# Patient Record
Sex: MALE | Race: BLACK OR AFRICAN AMERICAN | NOT HISPANIC OR LATINO | Employment: UNEMPLOYED | URBAN - METROPOLITAN AREA
[De-identification: names, ages, dates, MRNs, and addresses within clinical notes are randomized per-mention and may not be internally consistent; named-entity substitution may affect disease eponyms.]

---

## 2020-11-22 ENCOUNTER — HOSPITAL ENCOUNTER (EMERGENCY)
Facility: HOSPITAL | Age: 25
Discharge: HOME | End: 2020-11-22
Attending: FAMILY MEDICINE
Payer: MEDICAID

## 2020-11-22 VITALS
RESPIRATION RATE: 20 BRPM | HEIGHT: 73 IN | TEMPERATURE: 99 F | WEIGHT: 181.56 LBS | OXYGEN SATURATION: 96 % | DIASTOLIC BLOOD PRESSURE: 64 MMHG | HEART RATE: 96 BPM | SYSTOLIC BLOOD PRESSURE: 131 MMHG | BODY MASS INDEX: 24.06 KG/M2

## 2020-11-22 DIAGNOSIS — S49.90XA ARM INJURY: ICD-10-CM

## 2020-11-22 DIAGNOSIS — S40.021A CONTUSION OF RIGHT UPPER EXTREMITY, INITIAL ENCOUNTER: Primary | ICD-10-CM

## 2020-11-22 PROCEDURE — 99283 EMERGENCY DEPT VISIT LOW MDM: CPT | Mod: 25

## 2020-11-22 RX ORDER — DICLOFENAC SODIUM 50 MG/1
50 TABLET, DELAYED RELEASE ORAL 2 TIMES DAILY
Qty: 10 TABLET | Refills: 0 | Status: SHIPPED | OUTPATIENT
Start: 2020-11-22 | End: 2020-11-27

## 2020-11-23 NOTE — ED PROVIDER NOTES
HISTORY     Chief Complaint   Patient presents with    Arm Pain     kicked by horse in r arm     Review of patient's allergies indicates:  No Known Allergies     HPI   The history is provided by the patient. No  was used.   Arm Injury   The incident occurred just prior to arrival. The incident occurred at home. Injury mechanism: roberto carlos horse kicked his right forearm. Torso injury location: right forearm. Pertinent negatives include no chest pain, no altered mental status, no numbness, no visual disturbance, no abdominal pain, no bowel incontinence, no nausea, no vomiting, no bladder incontinence, no headaches, no hearing loss, no inability to bear weight, no neck pain, no pain when bearing weight, no focal weakness, no decreased responsiveness, no light-headedness, no loss of consciousness, no seizures, no tingling, no weakness, no cough, no difficulty breathing and no memory loss.        PCP: Primary Doctor No     Past Medical History:  No past medical history on file.     Past Surgical History:  No past surgical history on file.     Family History:  No family history on file.     Social History:  Social History     Tobacco Use    Smoking status: Not on file   Substance and Sexual Activity    Alcohol use: Not on file    Drug use: Not on file    Sexual activity: Not on file         ROS   Review of Systems   Constitutional: Negative for decreased responsiveness and fever.   HENT: Negative for hearing loss and sore throat.    Eyes: Negative for visual disturbance.   Respiratory: Negative for cough and shortness of breath.    Cardiovascular: Negative for chest pain.   Gastrointestinal: Negative for abdominal pain, bowel incontinence, nausea and vomiting.   Genitourinary: Negative for bladder incontinence and dysuria.   Musculoskeletal: Negative for back pain and neck pain.        +right forearm pain     Skin: Negative for rash.   Neurological: Negative for tingling, focal weakness,  seizures, loss of consciousness, weakness, light-headedness, numbness and headaches.   Hematological: Does not bruise/bleed easily.   Psychiatric/Behavioral: Negative for memory loss.       PHYSICAL EXAM     Initial Vitals [11/22/20 2011]   BP Pulse Resp Temp SpO2   131/64 96 20 98.6 °F (37 °C) 96 %      MAP       --           Physical Exam    Nursing note and vitals reviewed.  Constitutional: He appears well-developed and well-nourished. He is not diaphoretic. No distress.   HENT:   Head: Normocephalic and atraumatic.   Eyes: Right eye exhibits no discharge. Left eye exhibits no discharge.   Neck: Normal range of motion. Neck supple.   Cardiovascular: Normal rate.   Pulmonary/Chest: No respiratory distress.   Abdominal: Soft. He exhibits no distension.   Musculoskeletal: Normal range of motion.      Comments: Mild swelling noted to mid aspect of right dorsal forearm. No obvious deformity. Radial and ulnar pulses 2+. Cap refill <2sec. Rue nvi   Neurological: He is alert and oriented to person, place, and time. He has normal strength.   Skin: Skin is warm and dry.   Psychiatric: He has a normal mood and affect. His behavior is normal. Thought content normal.          ED COURSE   Splint Application    Date/Time: 11/22/2020 8:29 PM  Performed by: Azeem Mcnair NP  Authorized by: Azeem Mcnair NP   Consent Done: Yes  Consent: Verbal consent obtained. Written consent not obtained.  Risks and benefits: risks, benefits and alternatives were discussed  Consent given by: patient  Patient understanding: patient states understanding of the procedure being performed  Patient consent: the patient's understanding of the procedure matches consent given  Procedure consent: procedure consent matches procedure scheduled  Relevant documents: relevant documents present and verified  Patient identity confirmed: name  Location details: right arm  Splint type: sling.  Supplies used: sling.  Post-procedure: The splinted body  "part was neurovascularly unchanged following the procedure.  Patient tolerance: Patient tolerated the procedure well with no immediate complications        ED ONGOING VITALS:  Vitals:    11/22/20 2009 11/22/20 2011   BP:  131/64   Pulse:  96   Resp:  20   Temp:  98.6 °F (37 °C)   TempSrc:  Oral   SpO2:  96%   Weight: 82.3 kg (181 lb 8.8 oz)    Height: 6' 1" (1.854 m)          ABNORMAL LAB VALUES:  Labs Reviewed - No data to display      ALL LAB VALUES:  none    RADIOLOGY STUDIES:  Imaging Results          X-Ray Forearm Right (Final result)  Result time 11/22/20 20:18:22    Final result by Suleiman Ramsey MD (11/22/20 20:18:22)                 Impression:      No acute abnormality.      Electronically signed by: Suleiman Ramsey  Date:    11/22/2020  Time:    20:18             Narrative:    EXAMINATION:  XR FOREARM RIGHT    CLINICAL HISTORY:  Unspecified injury of shoulder and upper arm, unspecified arm, initial encounter    TECHNIQUE:  AP and lateral views of the right forearm were performed.    COMPARISON:  None    FINDINGS:  No fracture.  No dislocation.  No osseous destructive process.    Soft tissues grossly intact.                                          The above vital signs and test results have been reviewed by the emergency provider.     ED Medications:  There are no discharge medications for this patient.    Discharge Medications:  New Prescriptions    DICLOFENAC (VOLTAREN) 50 MG EC TABLET    Take 1 tablet (50 mg total) by mouth 2 (two) times daily. for 5 days      Follow-up Information     pcp of choice.    Why: As needed                8:30 PM    I discussed with patient and/or family/caretaker that evaluation in the ED does not suggest any emergent or life threatening medical conditions requiring immediate intervention beyond what was provided in the ED, and I believe patient is safe for discharge. Regardless, an unremarkable evaluation in the ED does not preclude the development or presence of a " serious or life threatening condition. As such, patient was instructed to return immediately for any worsening or change in current symptoms.    Pre-hypertension/Hypertension: The pt has been informed that they may have pre-hypertension or hypertension based on a blood pressure reading in the ED. I recommend that the pt call the PCP listed on their discharge instructions or a physician of their choice this week to arrange f/u for further evaluation of possible pre-hypertension or hypertension.       MEDICAL DECISION MAKING                 CLINICAL IMPRESSION       ICD-10-CM ICD-9-CM   1. Contusion of right upper extremity, initial encounter  S40.021A 923.9   2. Arm injury  S49.90XA 959.2       Disposition:   Disposition: Discharged  Condition: Stable         Azeem Mcnair NP  11/22/20 2030